# Patient Record
Sex: FEMALE | Race: BLACK OR AFRICAN AMERICAN | Employment: OTHER | URBAN - METROPOLITAN AREA
[De-identification: names, ages, dates, MRNs, and addresses within clinical notes are randomized per-mention and may not be internally consistent; named-entity substitution may affect disease eponyms.]

---

## 2018-12-17 ENCOUNTER — APPOINTMENT (OUTPATIENT)
Dept: NUTRITION | Age: 73
End: 2018-12-17
Payer: MEDICARE

## 2018-12-27 ENCOUNTER — HOSPITAL ENCOUNTER (OUTPATIENT)
Dept: NUTRITION | Age: 73
Discharge: HOME | End: 2018-12-27
Payer: MEDICARE

## 2018-12-27 PROCEDURE — 97802 MEDICAL NUTRITION INDIV IN: CPT

## 2018-12-27 NOTE — PROGRESS NOTES
[facility] - Outpatient Nutrition Services  [address] [place] [address], [address] [place] [address], [address] [facility]  Phone: [phone]  Fax: [phone]   Nutrition Assessment  Medical Nutrition Therapy   Outpatient Initial Evaluation         Patient Name: Leilani Healy : 1945   Treatment Diagnosis: DM2, HLD   Referral Source: Arnaldo Grimm NP Start of Care [facility]): 2018     Gender: female Age: 68 y.o. Ht: 66 In Wt: 165 lb  kg   BMI: 26.6 BMR   Male  BMR Female 1275     Past Medical History includes: DM2 (new Dx), HLD, HTN, arthritis, tobacco use      Pertinent Medications:   Cholesterol & BP meds     Subjective/Assessment:   69 yo female referred for help with blood sugar and cholesterol control. Pt reports DM2 is a recent diagnosis, but she was not started on medication. Pt questions were answered about what to eat and how to read food labels. 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 She reports that she is currently not doing any exercise outside of daily living, but she can start going for walks. Pt verbalizes understanding recommendations and will call to follow up as needed. Current Eating Patterns: 6am: wake up  8am: cereal or sausage + 1 slice toast or [employer] and honey bar  Was snacking on cookies at work (9am-1pm), and skips lunch  3pm: sandwich or hot dog + cabbage, greens + baked beans, corn  Snacks on cookies later on. Recently switched from regular Pepsi to Diet Coke, some water and wants to try Crystal Light. Since pt quit smoking recently, she has been sucking on sugar free peppermints frequently. Estimate Needs   Calories: 1300  Protein: 80 Carbs: 145 Fat: 43   Kcal/day  g/day  g/day  g/day        percent: 25  45  30               Education & Recommendations provided: Educated pt on the pathophysiology of Type II Diabetes, insulin resistance and the rationale for dietary modifications and increased activity.  Educated pt on carbohydrate food sources, counting carbohydrates, label reading, meal timing, and appropriate serving sizes. Encouraged pt to avoid sugary beverages and focus on balanced meal model and carb ranges. Handouts Provided: [x]  Carbohydrates  [x]  Protein  [x]  Non-starchy Vegatbles  [x]  Food Label  [x]  Meal and Snack Ideas  []  Food Journals []  Diabetes  []  Cholesterol  []  Sodium  []  Gen Nutr Guidelines  []  SBGM Guidelines  [x]  Others: balanced meal model   Information Reviewed with: pt   Readiness to Change Stage: []  Pre-contemplative    []  Contemplative  [x]  Preparation               []  Action                  []  Maintenance   Potential Barriers to Learning: []  Decline in memory    []  Language barrier   []  Other:  []  Emotional                  []  Limited mobility  []  Lack of motivation     [] Vision, hearing or cognitive impairment   Expected Compliance: Good      Nutritional Goal - To promote lifestyle changes to result in:    []  Weight loss  [x]  Improved diabetic control  [x]  Decreased cholesterol levels  []  Decreased blood pressure  [x]  Weight maintenance []  Preventing any interactions associated with food allergies  []  Adequate weight gain toward goal weight  []  Other:        Patient Goals:   1. Avoid going longer than 5 hours without eating. Try to have a balanced snack by 1:00pm.  2. Pair carbohydrates with protein whenever possible. 3. Start reading food labels for grams of total carbohydrate and try to stay between 30-45g per meal and 10-20g per snack. 4. Start walking 2-3 times per week for 30 minutes.       Dietitian Signature: Kishore Cohen MS, RD Date: 12/27/2018   Follow-up: Call as needed Time: 10:25 AM